# Patient Record
Sex: FEMALE | Race: WHITE | NOT HISPANIC OR LATINO | Employment: STUDENT | URBAN - METROPOLITAN AREA
[De-identification: names, ages, dates, MRNs, and addresses within clinical notes are randomized per-mention and may not be internally consistent; named-entity substitution may affect disease eponyms.]

---
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
## 2025-06-30 ENCOUNTER — OFFICE VISIT (OUTPATIENT)
Dept: URGENT CARE | Facility: CLINIC | Age: 20
End: 2025-06-30
Payer: COMMERCIAL

## 2025-06-30 VITALS
WEIGHT: 152.2 LBS | HEART RATE: 74 BPM | BODY MASS INDEX: 25.36 KG/M2 | SYSTOLIC BLOOD PRESSURE: 112 MMHG | TEMPERATURE: 98.1 F | RESPIRATION RATE: 16 BRPM | HEIGHT: 65 IN | OXYGEN SATURATION: 99 % | DIASTOLIC BLOOD PRESSURE: 74 MMHG

## 2025-06-30 DIAGNOSIS — H61.91 EARLOBE LESION, RIGHT: Primary | ICD-10-CM

## 2025-06-30 PROCEDURE — 99213 OFFICE O/P EST LOW 20 MIN: CPT | Performed by: PHYSICIAN ASSISTANT

## 2025-06-30 RX ORDER — MUPIROCIN 2 %
OINTMENT (GRAM) TOPICAL 3 TIMES DAILY
Qty: 30 G | Refills: 0 | Status: SHIPPED | OUTPATIENT
Start: 2025-06-30

## 2025-06-30 NOTE — PROGRESS NOTES
[facility]        NAME: Marilee Renee is a 19 y.o. female  : 2005    MRN: [id number]  DATE: 2025  TIME: 7:27 PM    Assessment and Plan   Earlobe lesion, right [H61.91]  1. Earlobe lesion, right  mupirocin (BACTROBAN) 2 % ointment        Will cover with mupirocin in case of beginning of infection.  Encouraged her to stop touching it.  Can follow-up with Derm if she is bothered by the keloid. Discussed strict return to care precautions as well as red flag symptoms which should prompt immediate ED referral. Pt verbalized understanding and is in agreement with plan.  Please follow up with your primary care provider within the next week. Please remember that your visit today was with an urgent care provider and should not replace follow up with your primary care provider for chronic medical issues or annual physicals.       Patient Instructions       Follow up with PCP in 3-5 days.  Proceed to  ER if symptoms worsen.    If tests are performed, our office will contact you with results only if changes need to made to the care plan discussed with you at the visit. You can review your full results on [facility].    Chief Complaint     Chief Complaint   Patient presents with    Ear Problem     Pt believes that she had a cyst on her ear lobe for about 6 months. Was squeezing it and noticed purple spot develop on the back of her ear lobe approx 4 days ago, believes it may be infected.          History of Present Illness       Pt is a 20 yo female pw R earlobe lesion x 6 mos. States she developed a small keloid or cyst within her ear piercing and kept touching and squeezing it, but now it has become painful and darker. No drainage. No fevers.        Review of Systems   Review of Systems   Constitutional:  Negative for fever.   HENT:  Negative for ear discharge and sore throat.    Skin:  Positive for color change.   Neurological:  Negative for headaches.         Current Medications     Current  "Medications[1]    Current Allergies     Allergies as of 06/30/2025    (No Known Allergies)            The following portions of the patient's history were reviewed and updated as appropriate: allergies, current medications, past family history, past medical history, past social history, past surgical history and problem list.     Past Medical History[2]    Past Surgical History[3]    Family History[4]      Medications have been verified.        Objective   /74   Pulse 74   Temp 98.1 °F (36.7 °C)   Resp 16   Ht 5' 5\" (1.651 m)   Wt 69 kg (152 lb 3.2 oz)   SpO2 99%   BMI 25.33 kg/m²        Physical Exam     Physical Exam  Vitals and nursing note reviewed.   Constitutional:       General: She is not in acute distress.     Appearance: Normal appearance. She is not ill-appearing.   HENT:      Head: Normocephalic and atraumatic.      Right Ear: Tympanic membrane and ear canal normal.      Left Ear: Tympanic membrane, ear canal and external ear normal.      Ears:      Comments: Right earlobe with swelling within piercing, mild bruising and swelling. No erythema or warmth.    Cardiovascular:      Rate and Rhythm: Normal rate and regular rhythm.      Heart sounds: Normal heart sounds.   Pulmonary:      Effort: Pulmonary effort is normal. No respiratory distress.      Breath sounds: Normal breath sounds. No wheezing, rhonchi or rales.     Skin:     General: Skin is warm and dry.      Capillary Refill: Capillary refill takes less than 2 seconds.     Neurological:      Mental Status: She is alert and oriented to person, place, and time.     Psychiatric:         Behavior: Behavior normal.                        [1]   Current Outpatient Medications:     mupirocin (BACTROBAN) 2 % ointment, Apply topically 3 (three) times a day, Disp: 30 g, Rfl: 0  [2] No past medical history on file.  [3] No past surgical history on file.  [4]   Family History  Problem Relation Name Age of Onset    Asthma Father Joesph Read      " Asthma Sister Ashleigh     Diabetes Paternal Grandmother Meg Renee